# Patient Record
Sex: MALE | Race: WHITE
[De-identification: names, ages, dates, MRNs, and addresses within clinical notes are randomized per-mention and may not be internally consistent; named-entity substitution may affect disease eponyms.]

---

## 2018-04-05 ENCOUNTER — HOSPITAL ENCOUNTER (EMERGENCY)
Dept: HOSPITAL 61 - ER | Age: 23
Discharge: LEFT BEFORE DISCHARGE | End: 2018-04-05
Payer: SELF-PAY

## 2018-04-05 DIAGNOSIS — R35.0: ICD-10-CM

## 2018-04-05 DIAGNOSIS — R10.32: Primary | ICD-10-CM

## 2018-04-05 LAB
ADD MAN DIFF?: NO
ALBUMIN SERPL-MCNC: 3.8 G/DL (ref 3.4–5)
ALP SERPL-CCNC: 70 U/L (ref 46–116)
ALT (SGPT): 22 U/L (ref 16–63)
AMPHETAMINE/METHAMPHETAMINE: (no result)
ANION GAP SERPL CALC-SCNC: 10 MMOL/L (ref 6–14)
AST SERPL-CCNC: 16 U/L (ref 15–37)
BACTERIA,URINE: (no result) /HPF
BARBITURATES: (no result)
BASO #: 0.1 X10^3/UL (ref 0–0.2)
BASO %: 1 % (ref 0–3)
BENZODIAZEPINES: (no result)
BILIRUB DIRECT SERPL-MCNC: 0.1 MG/DL (ref 0–0.2)
BILIRUBIN,URINE: NEGATIVE
BLOOD UREA NITROGEN: 17 MG/DL (ref 8–26)
CALCIUM: 9.1 MG/DL (ref 8.5–10.1)
CANNABINOIDS: (no result)
CHLORIDE: 103 MMOL/L (ref 98–107)
CLARITY,URINE: CLEAR
CO2 SERPL-SCNC: 26 MMOL/L (ref 21–32)
COCAINE: (no result)
COLOR,URINE: YELLOW
CREAT SERPL-MCNC: 0.8 MG/DL (ref 0.7–1.3)
EOS #: 0.1 X10^3/UL (ref 0–0.7)
EOS %: 2 % (ref 0–3)
ETHANOL, URINE: (no result)
GFR SERPLBLD BASED ON 1.73 SQ M-ARVRAT: 122 ML/MIN
GLUCOSE SERPL-MCNC: 105 MG/DL (ref 70–99)
GLUCOSE,URINE: NEGATIVE MG/DL
HCG SERPL-ACNC: 9.1 X10^3/UL (ref 4–11)
HEMATOCRIT: 44.3 % (ref 39–53)
HEMOGLOBIN: 15.3 G/DL (ref 13–17.5)
LYMPH #: 2.5 X10^3/UL (ref 1–4.8)
LYMPH %: 27 % (ref 24–48)
MEAN CORPUSCULAR HEMOGLOBIN: 32 PG (ref 25–35)
MEAN CORPUSCULAR HGB CONC: 35 G/DL (ref 31–37)
MEAN CORPUSCULAR VOLUME: 93 FL (ref 79–100)
METHADONE: (no result)
MONO #: 0.8 X10^3/UL (ref 0–1.1)
MONO %: 9 % (ref 0–9)
NEUT #: 5.6 X10^3UL (ref 1.8–7.7)
NEUT %: 61 % (ref 31–73)
NITRITE,URINE: NEGATIVE
OPIATES: (no result)
PH,URINE: 6
PHENCYCLIDINE: (no result)
PLATELET COUNT: 228 X10^3/UL (ref 140–400)
POTASSIUM SERPL-SCNC: 3.9 MMOL/L (ref 3.5–5.1)
PROTEIN,URINE: NEGATIVE MG/DL
RBC,URINE: (no result) /HPF (ref 0–2)
RED BLOOD COUNT: 4.78 X10^6/UL (ref 4.3–5.7)
RED CELL DISTRIBUTION WIDTH: 13.1 % (ref 11.5–14.5)
SODIUM: 139 MMOL/L (ref 136–145)
SPECIFIC GRAVITY,URINE: 1.02
SQUAMOUS EPITHELIAL CELL,UR: (no result) /LPF
TOTAL BILIRUBIN: 0.4 MG/DL (ref 0.2–1)
TOTAL PROTEIN: 7.5 G/DL (ref 6.4–8.2)
UROBILINOGEN,URINE: 0.2 MG/DL
WBC,URINE: (no result) /HPF (ref 0–4)

## 2018-04-05 PROCEDURE — 96374 THER/PROPH/DIAG INJ IV PUSH: CPT

## 2018-04-05 PROCEDURE — 36415 COLL VENOUS BLD VENIPUNCTURE: CPT

## 2018-04-05 PROCEDURE — 85025 COMPLETE CBC W/AUTO DIFF WBC: CPT

## 2018-04-05 PROCEDURE — 96361 HYDRATE IV INFUSION ADD-ON: CPT

## 2018-04-05 PROCEDURE — 80048 BASIC METABOLIC PNL TOTAL CA: CPT

## 2018-04-05 PROCEDURE — 80076 HEPATIC FUNCTION PANEL: CPT

## 2018-04-05 PROCEDURE — 96375 TX/PRO/DX INJ NEW DRUG ADDON: CPT

## 2018-04-05 PROCEDURE — 80307 DRUG TEST PRSMV CHEM ANLYZR: CPT

## 2018-04-05 PROCEDURE — 99284 EMERGENCY DEPT VISIT MOD MDM: CPT

## 2018-04-05 PROCEDURE — 81001 URINALYSIS AUTO W/SCOPE: CPT

## 2018-04-05 RX ADMIN — MORPHINE SULFATE 1 MG: 4 INJECTION, SOLUTION INTRAMUSCULAR; INTRAVENOUS at 07:43

## 2018-04-05 RX ADMIN — BACITRACIN 1 MLS/HR: 5000 INJECTION, POWDER, FOR SOLUTION INTRAMUSCULAR at 07:41

## 2018-04-05 RX ADMIN — ONDANSETRON 1 MG: 2 INJECTION INTRAMUSCULAR; INTRAVENOUS at 07:42

## 2020-05-27 ENCOUNTER — HOSPITAL ENCOUNTER (EMERGENCY)
Dept: HOSPITAL 61 - ER | Age: 25
Discharge: HOME | End: 2020-05-27
Payer: SELF-PAY

## 2020-05-27 VITALS — SYSTOLIC BLOOD PRESSURE: 140 MMHG | DIASTOLIC BLOOD PRESSURE: 88 MMHG

## 2020-05-27 VITALS — WEIGHT: 211.64 LBS | BODY MASS INDEX: 31.35 KG/M2 | HEIGHT: 69 IN
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
## 2020-05-27 DIAGNOSIS — Z88.8: ICD-10-CM
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
## 2020-05-27 DIAGNOSIS — S29.019A: Primary | ICD-10-CM
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
## 2020-05-27 DIAGNOSIS — Z88.6: ICD-10-CM

## 2020-05-27 DIAGNOSIS — F17.200: ICD-10-CM

## 2020-05-27 PROCEDURE — 99283 EMERGENCY DEPT VISIT LOW MDM: CPT

## 2020-05-27 NOTE — PHYS DOC
Past Medical History


Past Medical History:  Unknown, Other


Additional Past Medical Histor:  "bleeding ulcer"


Past Surgical History:  Other


Additional Past Surgical Histo:  right wrist surgery


Smoking Status:  Current Every Day Smoker


Alcohol Use:  None


Drug Use:  Marijuana


Social History Narrative:  LAST USED [place] 8 MOS AGO





General Adult


EDM:


Chief Complaint:  BACK PAIN OR INJURY





HPI:


HPI:





Patient is a 24  year old male who presents the ED today complaining of 7 out of

10 mid and upper back pain that began 2 days ago after lifting furniture helping

a friend move.  Patient denies any injury.  Reports the pain is worse when he is

laying down.  He states his been trying natural remedies with no relief and 

would like something for his pain.  He reports he is allergic to Tylenol 

ibuprofen and tramadol.  Denies any pain radiating to bilateral upper or lower 

extremities.  Denies any numbness or tingling to bilateral upper or lower 

extremities.





Review of Systems:


Review of Systems:


Constitutional:  Denies fever or chills. []


GI:  Denies abdominal pain, nausea, vomiting, bloody stools or diarrhea. [] 


:  Denies dysuria. [] 


Musculoskeletal: Reports mid and upper back


Integument:  Denies rash. [] 


Neurologic:  Denies headache, focal weakness or sensory changes. [] 


Psychiatric:  Denies depression or anxiety. []





Heart Score:


Risk Factors:


Risk Factors:  DM, Current or recent (<one month) smoker, HTN, HLP, family 

history of CAD, obesity.


Risk Scores:


Score 0 - 3:  2.5% MACE over next 6 weeks - Discharge Home


Score 4 - 6:  20.3% MACE over next 6 weeks - Admit for Clinical Observation


Score 7 - 10:  72.7% MACE over next 6 weeks - Early Invasive Strategies





Allergies:


Allergies:





Allergies








Coded Allergies Type Severity Reaction Last Updated Verified


 


  acetaminophen Allergy Intermediate  5/27/20 Yes


 


  ibuprofen Allergy Intermediate  5/27/20 Yes


 


  tramadol Allergy Intermediate  5/27/20 Yes











Physical Exam:


PE:





Constitutional: Well developed, well nourished, no acute distress, non-toxic 

appearance. []


Abdomen: Bowel sounds normal, soft, no tenderness, no masses, no pulsatile 

masses. [] 


Skin: Warm, dry, no erythema, no rash. [] 


Back: Spine exam deferred, patient thrashing around in the chair


Extremities: No tenderness, no cyanosis, no clubbing, ROM intact, no edema. [] 


Neurologic: Alert and oriented X 3, normal motor function, normal sensory 

function, no focal deficits noted. []


Psychologic: Affect normal, judgement normal, mood normal. []





Current Patient Data:


Vital Signs:





                                   Vital Signs








  Date Time  Temp Pulse Resp B/P (MAP) Pulse Ox O2 Delivery O2 Flow Rate FiO2


 


5/27/20 14:27 98.7 99 16 140/88 (105) 100 Room Air  





 98.7       











EKG:


EKG:


[]





Radiology/Procedures:


Radiology/Procedures:


[]





Course & Med Decision Making:


Course & Med Decision Making


Pertinent Labs and Imaging studies reviewed. (See chart for details)





This is a 24-year-old male patient presenting to the ED today with mid and  

upper back pain that began 2 days ago after helping his friend move.  Patient is

 requesting something for his pain stating he is allergic to Tylenol ibuprofen 

and tramadol.  Informed patient we can write him prescription for topical 

Voltaren cream, cyclobenzaprine and Medrol Dosepak.  Informed patient narcotics 

are not recommended for back pain hence we will not write him any narcotics.  

Informed patient we will give him referral to a spine specialist, pain clinic 

and also recommended chiropractor.





Dragon Disclaimer:


Dragon Disclaimer:


This electronic medical record was generated, in whole or in part, using a voice

 recognition dictation system.





Departure


Departure


Impression:  


   Primary Impression:  


   Acute thoracic myofascial strain


   Qualified Codes:  S29.019A - Strain of muscle and tendon of unspecified wall 

   of thorax, initial encounter


Disposition:  01 HOME, SELF-CARE


Condition:  STABLE


Referrals:  


NO PCP (PCP)








DUNCAN LYNN MD


follow up in 1 week





RICK KENNEDY MD


follow up in 1 week


Patient Instructions:  Thoracic Strain, Easy-to-Read





Additional Instructions:  


You were seen for back pain, we recommend you establish care with the provided 

back pain clinic doctor and you can follow-up with them as an outpatient.  Use 

the medicines prescribed as ordered.


Scripts


Cyclobenzaprine Hcl (CYCLOBENZAPRINE HCL) 10 Mg Tablet


1 TAB PO TID, #30 TAB


   Prov: MUTUNGA,TERENCE APRN         5/27/20 


Methylprednisolone (MEDROL) 4 Mg Tab.ds.pk


1 PKG PO UD, #1 PKG


   Prov: MUTUNGA,TERENCE APRN         5/27/20











MUTUNGA,TERENCE APRN              May 27, 2020 15:10